# Patient Record
Sex: FEMALE | Race: OTHER
[De-identification: names, ages, dates, MRNs, and addresses within clinical notes are randomized per-mention and may not be internally consistent; named-entity substitution may affect disease eponyms.]

---

## 2023-02-04 ENCOUNTER — HOSPITAL ENCOUNTER (EMERGENCY)
Dept: HOSPITAL 54 - ER | Age: 33
LOS: 1 days | Discharge: HOME | End: 2023-02-05
Payer: SELF-PAY

## 2023-02-04 VITALS — WEIGHT: 128 LBS | BODY MASS INDEX: 20.57 KG/M2 | HEIGHT: 66 IN

## 2023-02-04 DIAGNOSIS — R07.9: Primary | ICD-10-CM

## 2023-02-04 LAB
BASOPHILS # BLD AUTO: 0.1 K/UL (ref 0–0.2)
BASOPHILS NFR BLD AUTO: 1 % (ref 0–2)
BUN SERPL-MCNC: 15 MG/DL (ref 7–18)
CALCIUM SERPL-MCNC: 8.5 MG/DL (ref 8.5–10.1)
CHLORIDE SERPL-SCNC: 104 MMOL/L (ref 98–107)
CO2 SERPL-SCNC: 26 MMOL/L (ref 21–32)
CREAT SERPL-MCNC: 0.7 MG/DL (ref 0.6–1.3)
EOSINOPHIL NFR BLD AUTO: 8.1 % (ref 0–6)
GLUCOSE SERPL-MCNC: 87 MG/DL (ref 74–106)
HCT VFR BLD AUTO: 40 % (ref 33–45)
HGB BLD-MCNC: 13.6 G/DL (ref 11.5–14.8)
LYMPHOCYTES NFR BLD AUTO: 2.7 K/UL (ref 0.8–4.8)
LYMPHOCYTES NFR BLD AUTO: 44.1 % (ref 20–44)
MCHC RBC AUTO-ENTMCNC: 34 G/DL (ref 31–36)
MCV RBC AUTO: 98 FL (ref 82–100)
MONOCYTES NFR BLD AUTO: 0.4 K/UL (ref 0.1–1.3)
MONOCYTES NFR BLD AUTO: 6.6 % (ref 2–12)
NEUTROPHILS # BLD AUTO: 2.5 K/UL (ref 1.8–8.9)
NEUTROPHILS NFR BLD AUTO: 40.2 % (ref 43–81)
PLATELET # BLD AUTO: 330 K/UL (ref 150–450)
POTASSIUM SERPL-SCNC: 3.2 MMOL/L (ref 3.5–5.1)
RBC # BLD AUTO: 4.06 MIL/UL (ref 4–5.2)
SODIUM SERPL-SCNC: 138 MMOL/L (ref 136–145)
WBC NRBC COR # BLD AUTO: 6.2 K/UL (ref 4.3–11)

## 2023-02-05 VITALS — DIASTOLIC BLOOD PRESSURE: 72 MMHG | SYSTOLIC BLOOD PRESSURE: 105 MMHG

## 2023-02-05 NOTE — NUR
Patient discharged to home in stable condition. Written and verbal after care 
instructions given. Patient verbalizes understanding of instruction. IV access 
removed.